# Patient Record
Sex: FEMALE | Race: ASIAN | NOT HISPANIC OR LATINO | Employment: UNEMPLOYED | URBAN - METROPOLITAN AREA
[De-identification: names, ages, dates, MRNs, and addresses within clinical notes are randomized per-mention and may not be internally consistent; named-entity substitution may affect disease eponyms.]

---

## 2017-01-06 ENCOUNTER — OFFICE VISIT (OUTPATIENT)
Dept: MEDICAL GROUP | Facility: MEDICAL CENTER | Age: 62
End: 2017-01-06
Payer: COMMERCIAL

## 2017-01-06 VITALS
HEIGHT: 59 IN | DIASTOLIC BLOOD PRESSURE: 74 MMHG | SYSTOLIC BLOOD PRESSURE: 122 MMHG | OXYGEN SATURATION: 96 % | TEMPERATURE: 98.6 F | HEART RATE: 74 BPM | RESPIRATION RATE: 16 BRPM | BODY MASS INDEX: 29.84 KG/M2 | WEIGHT: 148 LBS

## 2017-01-06 DIAGNOSIS — Z13.6 SCREENING FOR CARDIOVASCULAR CONDITION: ICD-10-CM

## 2017-01-06 DIAGNOSIS — I10 ESSENTIAL HYPERTENSION: ICD-10-CM

## 2017-01-06 DIAGNOSIS — H26.9 CATARACT OF BOTH EYES: ICD-10-CM

## 2017-01-06 DIAGNOSIS — M16.12 PRIMARY OSTEOARTHRITIS OF LEFT HIP: ICD-10-CM

## 2017-01-06 DIAGNOSIS — L30.9 ECZEMA, UNSPECIFIED TYPE: ICD-10-CM

## 2017-01-06 DIAGNOSIS — L30.1 DYSHIDROTIC ECZEMA: ICD-10-CM

## 2017-01-06 PROCEDURE — 99204 OFFICE O/P NEW MOD 45 MIN: CPT | Performed by: FAMILY MEDICINE

## 2017-01-06 RX ORDER — TRIAMCINOLONE ACETONIDE 1 MG/G
OINTMENT TOPICAL
Qty: 1 TUBE | Refills: 1 | Status: SHIPPED | OUTPATIENT
Start: 2017-01-06 | End: 2017-03-24

## 2017-01-06 RX ORDER — HYDROCODONE BITARTRATE AND ACETAMINOPHEN 5; 325 MG/1; MG/1
1 TABLET ORAL EVERY 8 HOURS PRN
Qty: 60 TAB | Refills: 0 | Status: SHIPPED | OUTPATIENT
Start: 2017-01-06 | End: 2018-01-23

## 2017-01-06 RX ORDER — LOSARTAN POTASSIUM 50 MG/1
50 TABLET ORAL
COMMUNITY
End: 2017-01-06

## 2017-01-06 RX ORDER — TRIAMCINOLONE ACETONIDE 1 MG/G
CREAM TOPICAL
Qty: 1 TUBE | Refills: 1 | Status: SHIPPED | OUTPATIENT
Start: 2017-01-06 | End: 2017-01-06 | Stop reason: CLARIF

## 2017-01-06 RX ORDER — LOSARTAN POTASSIUM 100 MG/1
100 TABLET ORAL DAILY
Qty: 90 TAB | Refills: 3 | Status: SHIPPED | OUTPATIENT
Start: 2017-01-06 | End: 2018-01-21 | Stop reason: SDUPTHER

## 2017-01-06 ASSESSMENT — PATIENT HEALTH QUESTIONNAIRE - PHQ9: CLINICAL INTERPRETATION OF PHQ2 SCORE: 0

## 2017-01-06 NOTE — MR AVS SNAPSHOT
"        Milana Hernandez   2017 8:00 AM   Office Visit   MRN: [id number]    Department:  [address] [place] [address]   Dept Phone:  [phone]    Description:  Female : 1955   Provider:  Abdirahman Lopez M.D.           Reason for Visit     Establish Care           Allergies as of 2017     No Known Allergies      You were diagnosed with     Essential hypertension   [4714782]       Screening for cardiovascular condition   [741473]       Primary osteoarthritis of left hip   [646607]       Cataract of both eyes   [740229]       Eczema, unspecified type   [2739217]       Dyshidrotic eczema   [824532]         Vital Signs     Blood Pressure Pulse Temperature Respirations Height Weight    122/74 mmHg 74 37 °C (98.6 °F) 16 1.499 m (4' 11.02\") 67.132 kg (148 lb)    Body Mass Index Oxygen Saturation Smoking Status             29.88 kg/m2 96% Never Smoker          Basic Information     Date Of Birth Sex Race Preferred Language       1955 Female White English       Problem List              ICD-10-CM Priority Class Noted - Resolved    Essential hypertension I10   2017 - Present    Primary osteoarthritis of left hip M16.12   2017 - Present    Cataract of both eyes H26.9   2017 - Present    Dyshidrotic eczema L30.1   2017 - Present      Health Maintenance     Patient has no pending health maintenance at this time      Current Immunizations     No immunizations on file.      Below and/or attached are the medications your provider expects you to take. Review all of your home medications and newly ordered medications with your provider and/or pharmacist. Follow medication instructions as directed by your provider and/or pharmacist. Please keep your medication list with you and share with your provider. Update the information when medications are discontinued, doses are changed, or new medications (including over-the-counter products) are added; and carry medication information at all times in the event " of emergency situations     Allergies:  No Known Allergies          Medications  Valid as of: January 06, 2017 -  9:08 AM    Generic Name Brand Name Tablet Size Instructions for use    Hydrocodone-Acetaminophen (Tab) NORCO 5-325 MG Take 1 Tab by mouth every 8 hours as needed (severe pain).        Losartan Potassium (Tab) COZAAR 100 MG Take 1 Tab by mouth every day.        Triamcinolone Acetonide (Ointment) KENALOG 0.1 % Apply thin layer to affected area twice a day for 14 days        .                 Medicines prescribed today were sent to:     [facility], [place] - [address] [place] [address]    Phone: [phone] Fax: [phone]    Open 24 Hours?: No      Medication refill instructions:       If your prescription bottle indicates you have medication refills left, it is not necessary to call your provider’s office. Please contact your pharmacy and they will refill your medication.    If your prescription bottle indicates you do not have any refills left, you may request refills at any time through one of the following ways: The online [email or web address] system (except Urgent Care), by calling your provider’s office, or by asking your pharmacy to contact your provider’s office with a refill request. Medication refills are processed only during regular business hours and may not be available until the next business day. Your provider may request additional information or to have a follow-up visit with you prior to refilling your medication.   *Please Note: Medication refills are assigned a new Rx number when refilled electronically. Your pharmacy may indicate that no refills were authorized even though a new prescription for the same medication is available at the pharmacy. Please request the medicine by name with the pharmacy before contacting your provider for a refill.        Your To Do List     Future Labs/Procedures Complete By Expires    COMP METABOLIC PANEL  As directed 1/6/2018     LIPID PROFILE  As directed 1/6/2018    URINALYSIS  As directed 1/6/2018      Referral     A referral request has been sent to our patient care coordination department. Please allow 3-5 business days for us to process this request and contact you either by phone or mail. If you do not hear from us by the 5th business day, please call us at [phone].           [employer] Access Code: [email or web address]  Expires: 2/2/2017  1:30 PM    [employer]  A secure, online tool to manage your health information     [employer]’s [employer]® is a secure, online tool that connects you to your personalized health information from the privacy of your home -- day or night - making it very easy for you to manage your healthcare. Once the activation process is completed, you can even access your medical information using the [employer] lupillo, which is available for free in the Apple Lupillo store or Google Play store.     [employer] provides the following levels of access (as shown below):   My Chart Features   [facility] Doctor [facility]  Specialists [facility]  Urgent  Care [facility]  Primary Care  Doctor   Email your healthcare team securely and privately 24/7 X X X    Manage appointments: schedule your next appointment; view details of past/upcoming appointments X      Request prescription refills. X      View recent personal medical records, including lab and immunizations X X X X   View health record, including health history, allergies, medications X X X X   Read reports about your outpatient visits, procedures, consult and ER notes X X X X   See your discharge summary, which is a recap of your hospital and/or ER visit that includes your diagnosis, lab results, and care plan. X X       How to register for [employer]:  1. Go to  [email or web address].  2. Click on the Sign Up Now box, which takes you to the New Member Sign Up page. You will need to provide the following information:  a. Enter your [employer] Access Code exactly as it appears  at the top of this page. (You will not need to use this code after you’ve completed the sign-up process. If you do not sign up before the expiration date, you must request a new code.)   b. Enter your date of birth.   c. Enter your home email address.   d. Click Submit, and follow the next screen’s instructions.  3. Create a [employer] ID. This will be your [employer] login ID and cannot be changed, so think of one that is secure and easy to remember.  4. Create a [employer] password. You can change your password at any time.  5. Enter your Password Reset Question and Answer. This can be used at a later time if you forget your password.   6. Enter your e-mail address. This allows you to receive e-mail notifications when new information is available in [employer].  7. Click Sign Up. You can now view your health information.    For assistance activating your [employer] account, call [phone]

## 2017-01-06 NOTE — PROGRESS NOTES
cc:  Multiple issues    Subjective:     Milana Hernandez is a 61 y.o. female presenting to establish care and to discuss the followin. Left hip pain:  Started about 2 years after walking a lot on a cruise.  Describes dull, achy pain that is intermittently sharp. Radiates to her left thigh and knee.  Has been progressively getting worse.  Associated with stiffness, now uses a cane to help with ambulation. Denies any redness, swelling, fevers, numbness/tingling, back pain. Used to do bartolo 1-2 x/week but now unable to exercise at all due to pain.  Has tried: massage tx, acupuncture, chiropractor, marijuana, hydrocodone 10, ibuprofen 800.  The meds and marijuana have helped but usually make her drowsy.  In November, she got x-rays, which she reports showed severe osteoarthritis in the left hip.  As she now has insurance, she would like treatment for the pain and is open to surgery.    2. HTN: has had hypertension for many years.  Is currently on losartan 50mg twice a day.  She was also on clonidine for a while but she stopped that on her own as she didn't think it was helping.  Does not check BPs at home.  Denies any chest pain, shortness of breath, leg swelling, jaw claudication, lightheadedness, dizziness.  Has not had any lab work done in several years.    3. Cataracts:  Was told that she has cataracts in both eyes several years ago.  Vision has been progressively getting worse, her night vision is problematic with lots of glare.  She already has an appt with the ophthalmologist, needs a referral.    4. Eczema:  Has had eczema of her hands for at least 4 years.  Started in the thumb area and has spread to her second digit bilaterally.  Is cracked, painful, itchy.  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  Has tried over the counter creams and betamethasone (unsure of dose) but not consistently.    Review of systems:  See above.  All other systems were reviewed and are  "negative.      Current outpatient prescriptions:   •  losartan (COZAAR) 100 MG Tab, Take 1 Tab by mouth every day., Disp: 90 Tab, Rfl: 3  •  hydrocodone-acetaminophen (NORCO) 5-325 MG Tab per tablet, Take 1 Tab by mouth every 8 hours as needed (severe pain)., Disp: 60 Tab, Rfl: 0  •  triamcinolone acetonide (KENALOG) 0.1 % Ointment, Apply thin layer to affected area twice a day for 14 days, Disp: 1 Tube, Rfl: 1    No Known Allergies    Past Medical History   Diagnosis Date   • Essential hypertension 1/6/2017     Past Surgical History   Procedure Laterality Date   • Tube & ectopic preg., removal       Family History   Problem Relation Age of Onset   • Diabetes Mother    • Arrythmia Father      afib     Social History     Social History   • Marital Status: N/A     Spouse Name: N/A   • Number of Children: N/A   • Years of Education: N/A     Occupational History   • Not on file.     Social History Main Topics   • Smoking status: Never Smoker    • Smokeless tobacco: Never Used   • Alcohol Use: 0.0 oz/week     0 Standard drinks or equivalent per week      Comment: rare   • Drug Use: No   • Sexual Activity: Not on file     Other Topics Concern   • Not on file     Social History Narrative       Objective:     Vitals: /74 mmHg  Pulse 74  Temp(Src) 37 °C (98.6 °F)  Resp 16  Ht 1.499 m (4' 11.02\")  Wt 67.132 kg (148 lb)  BMI 29.88 kg/m2  SpO2 96%  General: Alert, pleasant, NAD  HEENT: Normocephalic.  PERRL, EOMI, no icterus or pallor.  Conjunctivae and lids normal. External ears normal.  Neck supple.  No thyromegaly or masses palpated. No cervical or supraclavicular lymphadenopathy.  Heart: Regular rate and rhythm.  S1 and S2 normal.  Grade 2/6 systolic murmur best heard at RUSB  Respiratory: Normal respiratory effort.  Clear to auscultation bilaterally.    Abdomen: Non-distended, soft  Skin: Warm. Dry skin over thumbs and 2nd digits bilaterally with deep fissures.  Musculoskeletal: Gait is normal.  Moves all " extremities well.  Extremities: No leg edema.   Psych:  Affect/mood is normal, judgement is good, memory is intact, grooming is appropriate.    Assessment/Plan:     Milana was seen today for establish care.    Diagnoses and all orders for this visit:    Essential hypertension  Screening for cardiovascular condition  Is due for labs. Will change losartan to 100mg daily.  Discussed symptoms of low blood pressure to watch for when she starts the new prescription.  F/u in 3 months.  -     COMP METABOLIC PANEL; Future  -     LIPID PROFILE; Future  -     URINALYSIS; Future        -     losartan (COZAAR) 100 MG Tab; Take 1 Tab by mouth every day.    Primary osteoarthritis of left hip  Referral placed, advised pt to bring in report and film of x-rays done to appt.  Discussed pain management, will do tylenol 1000mg in the morning and norco 1-3x/day as needed.  She can continue to take ibuprofen 800mg daily.  Pt plans to take the norco only at night.  Discussed pros/cons of opioid use, she understands this is a temporary medication until surgery.  -     REFERRAL TO ORTHOPEDICS        -     hydrocodone-acetaminophen (NORCO) 5-325 MG Tab per tablet; Take 1 Tab by mouth every 8 hours as needed (severe pain).    Cataract of both eyes  -     REFERRAL TO OPHTHALMOLOGY    Eczema, unspecified type  Dyshidrotic eczema  Will try triamcinolone bid, wear gloves at night, try to cut down on washing hands or use hand cream immediately after.  -     triamcinolone acetonide (KENALOG) 0.1 % Ointment; Apply thin layer to affected area twice a day for 14 days         Return in about 3 months (around 4/6/2017) for Med check.

## 2017-02-06 ENCOUNTER — HOSPITAL ENCOUNTER (OUTPATIENT)
Dept: LAB | Facility: MEDICAL CENTER | Age: 62
End: 2017-02-06
Attending: FAMILY MEDICINE
Payer: COMMERCIAL

## 2017-02-06 DIAGNOSIS — Z13.6 SCREENING FOR CARDIOVASCULAR CONDITION: ICD-10-CM

## 2017-02-06 DIAGNOSIS — I10 ESSENTIAL HYPERTENSION: ICD-10-CM

## 2017-03-24 ENCOUNTER — OFFICE VISIT (OUTPATIENT)
Dept: MEDICAL GROUP | Facility: MEDICAL CENTER | Age: 62
End: 2017-03-24
Payer: COMMERCIAL

## 2017-03-24 VITALS
TEMPERATURE: 98.6 F | HEIGHT: 59 IN | RESPIRATION RATE: 14 BRPM | OXYGEN SATURATION: 95 % | BODY MASS INDEX: 29.43 KG/M2 | HEART RATE: 84 BPM | DIASTOLIC BLOOD PRESSURE: 90 MMHG | WEIGHT: 146 LBS | SYSTOLIC BLOOD PRESSURE: 138 MMHG

## 2017-03-24 DIAGNOSIS — Z12.31 ENCOUNTER FOR SCREENING MAMMOGRAM FOR MALIGNANT NEOPLASM OF BREAST: ICD-10-CM

## 2017-03-24 DIAGNOSIS — L30.1 DYSHIDROTIC ECZEMA: ICD-10-CM

## 2017-03-24 DIAGNOSIS — R32 URINARY INCONTINENCE, UNSPECIFIED TYPE: ICD-10-CM

## 2017-03-24 DIAGNOSIS — E78.2 MIXED HYPERLIPIDEMIA: ICD-10-CM

## 2017-03-24 DIAGNOSIS — Z12.11 SCREEN FOR COLON CANCER: ICD-10-CM

## 2017-03-24 DIAGNOSIS — Z12.4 SCREENING FOR CERVICAL CANCER: ICD-10-CM

## 2017-03-24 PROBLEM — M16.12 PRIMARY OSTEOARTHRITIS OF LEFT HIP: Status: RESOLVED | Noted: 2017-01-06 | Resolved: 2017-03-24

## 2017-03-24 PROCEDURE — 99214 OFFICE O/P EST MOD 30 MIN: CPT | Performed by: FAMILY MEDICINE

## 2017-03-24 RX ORDER — ASPIRIN 81 MG/1
81 TABLET, CHEWABLE ORAL DAILY
COMMUNITY
End: 2022-08-22 | Stop reason: SDUPTHER

## 2017-03-24 RX ORDER — MELOXICAM 15 MG/1
15 TABLET ORAL DAILY
COMMUNITY
End: 2017-07-03

## 2017-03-24 RX ORDER — ATORVASTATIN CALCIUM 20 MG/1
20 TABLET, FILM COATED ORAL
Qty: 90 TAB | Refills: 3 | Status: SHIPPED | OUTPATIENT
Start: 2017-03-24 | End: 2017-07-03 | Stop reason: SDUPTHER

## 2017-03-24 RX ORDER — CLOBETASOL PROPIONATE 0.5 MG/G
CREAM TOPICAL
Qty: 60 G | Refills: 2 | Status: SHIPPED | OUTPATIENT
Start: 2017-03-24 | End: 2018-06-01

## 2017-03-24 RX ORDER — ATORVASTATIN CALCIUM 20 MG/1
20 TABLET, FILM COATED ORAL
Qty: 90 TAB | Refills: 3 | Status: SHIPPED | OUTPATIENT
Start: 2017-03-24 | End: 2017-03-24 | Stop reason: SDUPTHER

## 2017-03-24 RX ORDER — CLOBETASOL PROPIONATE 0.5 MG/G
CREAM TOPICAL
Qty: 60 G | Refills: 2 | Status: SHIPPED | OUTPATIENT
Start: 2017-03-24 | End: 2017-03-24 | Stop reason: SDUPTHER

## 2017-03-24 NOTE — PATIENT INSTRUCTIONS
Cholesterol  Cholesterol is a white, waxy, fat-like substance needed by your body in small amounts. The liver makes all the cholesterol you need. Cholesterol is carried from the liver by the blood through the blood vessels. Deposits of cholesterol (plaque) may build up on blood vessel walls. These make the arteries narrower and stiffer. Cholesterol plaques increase the risk for heart attack and stroke.   You cannot feel your cholesterol level even if it is very high. The only way to know it is high is with a blood test. Once you know your cholesterol levels, you should keep a record of the test results. Work with your health care provider to keep your levels in the desired range.   WHAT DO THE RESULTS MEAN?  · Total cholesterol is a rough measure of all the cholesterol in your blood.    · LDL is the so-called bad cholesterol. This is the type that deposits cholesterol in the walls of the arteries. You want this level to be low.    · HDL is the good cholesterol because it cleans the arteries and carries the LDL away. You want this level to be high.  · Triglycerides are fat that the body can either burn for energy or store. High levels are closely linked to heart disease.    WHAT ARE THE DESIRED LEVELS OF CHOLESTEROL?  · Total cholesterol below 200.    · LDL below 100 for people at risk, below 70 for those at very high risk.    · HDL above 50 is good, above 60 is best.    · Triglycerides below 150.    HOW CAN I LOWER MY CHOLESTEROL?  · Diet. Follow your diet programs as directed by your health care provider.    ¨ Choose fish or white meat chicken and turkey, roasted or baked. Limit fatty cuts of red meat, fried foods, and processed meats, such as sausage and lunch meats.    ¨ Eat lots of fresh fruits and vegetables.  ¨ Choose whole grains, beans, pasta, potatoes, and cereals.    ¨ Use only small amounts of olive, corn, or canola oils.    ¨ Avoid butter, mayonnaise, shortening, or palm kernel oils.  ¨ Avoid foods with  "trans fats.    ¨ Drink skim or nonfat milk and eat low-fat or nonfat yogurt and cheeses. Avoid whole milk, cream, ice cream, egg yolks, and full-fat cheeses.    ¨ Healthy desserts include tarsha food cake, jazmyn snaps, animal crackers, hard candy, popsicles, and low-fat or nonfat frozen yogurt. Avoid pastries, cakes, pies, and cookies.    · Exercise. Follow your exercise programs as directed by your health care provider.    ¨ A regular program helps decrease LDL and raise HDL.    ¨ A regular program helps with weight control.    ¨ Do things that increase your activity level like gardening, walking, or taking the stairs. Ask your health care provider about how you can be more active in your daily life.    · Medicine. Take medicine only as directed by your health care provider.    ¨ Medicine may be prescribed by your health care provider to help lower cholesterol and decrease the risk for heart disease.    ¨ If you have several risk factors, you may need medicine even if your levels are normal.     This information is not intended to replace advice given to you by your health care provider. Make sure you discuss any questions you have with your health care provider.     Document Released: 09/12/2002 Document Revised: 01/08/2016 Document Reviewed: 10/01/2014  [employer] Interactive Patient Education ©2016 [employer]  DASH Eating Plan  DASH stands for \"Dietary Approaches to Stop Hypertension.\" The DASH eating plan is a healthy eating plan that has been shown to reduce high blood pressure (hypertension). Additional health benefits may include reducing the risk of type 2 diabetes mellitus, heart disease, and stroke. The DASH eating plan may also help with weight loss.  WHAT DO I NEED TO KNOW ABOUT THE DASH EATING PLAN?  For the DASH eating plan, you will follow these general guidelines:  · Choose foods with a percent daily value for sodium of less than 5% (as listed on the food label).  · Use salt-free seasonings or " "herbs instead of table salt or sea salt.  · Check with your health care provider or pharmacist before using salt substitutes.  · Eat lower-sodium products, often labeled as \"lower sodium\" or \"no salt added.\"  · Eat fresh foods.  · Eat more vegetables, fruits, and low-fat dairy products.  · Choose whole grains. Look for the word \"whole\" as the first word in the ingredient list.  · Choose fish and skinless chicken or turkey more often than red meat. Limit fish, poultry, and meat to 6 oz (170 g) each day.  · Limit sweets, desserts, sugars, and sugary drinks.  · Choose heart-healthy fats.  · Limit cheese to 1 oz (28 g) per day.  · Eat more home-cooked food and less restaurant, buffet, and fast food.  · Limit fried foods.  · Cook foods using methods other than frying.  · Limit canned vegetables. If you do use them, rinse them well to decrease the sodium.  · When eating at a restaurant, ask that your food be prepared with less salt, or no salt if possible.  WHAT FOODS CAN I EAT?  Seek help from a dietitian for individual calorie needs.  Grains  Whole grain or whole wheat bread. Brown rice. Whole grain or whole wheat pasta. Quinoa, bulgur, and whole grain cereals. Low-sodium cereals. Corn or whole wheat flour tortillas. Whole grain cornbread. Whole grain crackers. Low-sodium crackers.  Vegetables  Fresh or frozen vegetables (raw, steamed, roasted, or grilled). Low-sodium or reduced-sodium tomato and vegetable juices. Low-sodium or reduced-sodium tomato sauce and paste. Low-sodium or reduced-sodium canned vegetables.   Fruits  All fresh, canned (in natural juice), or frozen fruits.  Meat and Other Protein Products  Ground beef (85% or leaner), grass-fed beef, or beef trimmed of fat. Skinless chicken or turkey. Ground chicken or turkey. Pork trimmed of fat. All fish and seafood. Eggs. Dried beans, peas, or lentils. Unsalted nuts and seeds. Unsalted canned beans.  Dairy  Low-fat dairy products, such as skim or 1% milk, 2% or " reduced-fat cheeses, low-fat ricotta or cottage cheese, or plain low-fat yogurt. Low-sodium or reduced-sodium cheeses.  Fats and Oils  Tub margarines without trans fats. Light or reduced-fat mayonnaise and salad dressings (reduced sodium). Avocado. Safflower, olive, or canola oils. Natural peanut or almond butter.  Other  Unsalted popcorn and pretzels.  The items listed above may not be a complete list of recommended foods or beverages. Contact your dietitian for more options.  WHAT FOODS ARE NOT RECOMMENDED?  Grains  White bread. White pasta. White rice. Refined cornbread. Bagels and croissants. Crackers that contain trans fat.  Vegetables  Creamed or fried vegetables. Vegetables in a cheese sauce. Regular canned vegetables. Regular canned tomato sauce and paste. Regular tomato and vegetable juices.  Fruits  Dried fruits. Canned fruit in light or heavy syrup. Fruit juice.  Meat and Other Protein Products  Fatty cuts of meat. Ribs, chicken wings, ahn, sausage, bologna, salami, chitterlings, fatback, hot dogs, bratwurst, and packaged luncheon meats. Salted nuts and seeds. Canned beans with salt.  Dairy  Whole or 2% milk, cream, half-and-half, and cream cheese. Whole-fat or sweetened yogurt. Full-fat cheeses or blue cheese. Nondairy creamers and whipped toppings. Processed cheese, cheese spreads, or cheese curds.  Condiments  Onion and garlic salt, seasoned salt, table salt, and sea salt. Canned and packaged gravies. Worcestershire sauce. Tartar sauce. Barbecue sauce. Teriyaki sauce. Soy sauce, including reduced sodium. Steak sauce. Fish sauce. Oyster sauce. Cocktail sauce. Horseradish. Ketchup and mustard. Meat flavorings and tenderizers. Bouillon cubes. Hot sauce. Tabasco sauce. Marinades. Taco seasonings. Relishes.  Fats and Oils  Butter, stick margarine, lard, shortening, ghee, and ahn fat. Coconut, palm kernel, or palm oils. Regular salad dressings.  Other  Pickles and olives. Salted popcorn and  "pretzels.  The items listed above may not be a complete list of foods and beverages to avoid. Contact your dietitian for more information.  WHERE CAN I FIND MORE INFORMATION?  [employer] [place]: www.nhlbi.nih.gov/health/health-topics/topics/dash/     This information is not intended to replace advice given to you by your health care provider. Make sure you discuss any questions you have with your health care provider.     Document Released: 12/06/2012 Document Revised: 01/08/2016 Document Reviewed: 10/22/2014  [employer] Interactive Patient Education ©2016 [employer]  Food Choices to Lower Your Triglycerides  Triglycerides are a type of fat in your blood. High levels of triglycerides can increase the risk of heart disease and stroke. If your triglyceride levels are high, the foods you eat and your eating habits are very important. Choosing the right foods can help lower your triglycerides.   WHAT GENERAL GUIDELINES DO I NEED TO FOLLOW?  · Lose weight if you are overweight.    · Limit or avoid alcohol.    · Fill one half of your plate with vegetables and green salads.    · Limit fruit to two servings a day. Choose fruit instead of juice.    · Make one fourth of your plate whole grains. Look for the word \"whole\" as the first word in the ingredient list.  · Fill one fourth of your plate with lean protein foods.  · Enjoy fatty fish (such as salmon, mackerel, sardines, and tuna) three times a week.    · Choose healthy fats.    · Limit foods high in starch and sugar.  · Eat more home-cooked food and less restaurant, buffet, and fast food.  · Limit fried foods.  · Cook foods using methods other than frying.  · Limit saturated fats.  · Check ingredient lists to avoid foods with partially hydrogenated oils (trans fats) in them.  WHAT FOODS CAN I EAT?   Grains  Whole grains, such as whole wheat or whole grain breads, crackers, cereals, and pasta. Unsweetened oatmeal, bulgur, barley, quinoa, or brown " rice. Corn or whole wheat flour tortillas.   Vegetables  Fresh or frozen vegetables (raw, steamed, roasted, or grilled). Green salads.  Fruits  All fresh, canned (in natural juice), or frozen fruits.  Meat and Other Protein Products  Ground beef (85% or leaner), grass-fed beef, or beef trimmed of fat. Skinless chicken or turkey. Ground chicken or turkey. Pork trimmed of fat. All fish and seafood. Eggs. Dried beans, peas, or lentils. Unsalted nuts or seeds. Unsalted canned or dry beans.  Dairy  Low-fat dairy products, such as skim or 1% milk, 2% or reduced-fat cheeses, low-fat ricotta or cottage cheese, or plain low-fat yogurt.  Fats and Oils  Tub margarines without trans fats. Light or reduced-fat mayonnaise and salad dressings. Avocado. Safflower, olive, or canola oils. Natural peanut or almond butter.  The items listed above may not be a complete list of recommended foods or beverages. Contact your dietitian for more options.  WHAT FOODS ARE NOT RECOMMENDED?   Grains  White bread. White pasta. White rice. Cornbread. Bagels, pastries, and croissants. Crackers that contain trans fat.  Vegetables  White potatoes. Corn. Creamed or fried vegetables. Vegetables in a cheese sauce.  Fruits  Dried fruits. Canned fruit in light or heavy syrup. Fruit juice.  Meat and Other Protein Products  Fatty cuts of meat. Ribs, chicken wings, ahn, sausage, bologna, salami, chitterlings, fatback, hot dogs, bratwurst, and packaged luncheon meats.  Dairy  Whole or 2% milk, cream, half-and-half, and cream cheese. Whole-fat or sweetened yogurt. Full-fat cheeses. Nondairy creamers and whipped toppings. Processed cheese, cheese spreads, or cheese curds.  Sweets and Desserts  Corn syrup, sugars, honey, and molasses. Candy. Jam and jelly. Syrup. Sweetened cereals. Cookies, pies, cakes, donuts, muffins, and ice cream.  Fats and Oils  Butter, stick margarine, lard, shortening, ghee, or ahn fat. Coconut, palm kernel, or palm  "oils.  Beverages  Alcohol. Sweetened drinks (such as sodas, lemonade, and fruit drinks or punches).  The items listed above may not be a complete list of foods and beverages to avoid. Contact your dietitian for more information.     This information is not intended to replace advice given to you by your health care provider. Make sure you discuss any questions you have with your health care provider.     Document Released: 10/05/2005 Document Revised: 01/08/2016 Document Reviewed: 10/22/2014  [employer] Interactive Patient Education ©2016 [employer]  Fat and Cholesterol Restricted Diet  Getting too much fat and cholesterol in your diet may cause health problems. Following this diet helps keep your fat and cholesterol at normal levels. This can keep you from getting sick.  WHAT TYPES OF FAT SHOULD I CHOOSE?  · Choose monosaturated and polyunsaturated fats. These are found in foods such as olive oil, canola oil, flaxseeds, walnuts, almonds, and seeds.  · Eat more omega-3 fats. Good choices include salmon, mackerel, sardines, tuna, flaxseed oil, and ground flaxseeds.  · Limit saturated fats. These are in animal products such as meats, butter, and cream. They can also be in plant products such as palm oil, palm kernel oil, and coconut oil.  ·  Avoid foods with partially hydrogenated oils in them. These contain trans fats. Examples of foods that have trans fats are stick margarine, some tub margarines, cookies, crackers, and other baked goods.  WHAT GENERAL GUIDELINES DO I NEED TO FOLLOW?   · Check food labels. Look for the words \"trans fat\" and \"saturated fat.\"  · When preparing a meal:  ¨ Fill half of your plate with vegetables and green salads.  ¨ Fill one fourth of your plate with whole grains. Look for the word \"whole\" as the first word in the ingredient list.  ¨ Fill one fourth of your plate with lean protein foods.  · Limit fruit to two servings a day. Choose fruit instead of juice.  · Eat more foods with soluble " fiber. Examples of foods with this type of fiber are apples, broccoli, carrots, beans, peas, and barley. Try to get 20-30 g (grams) of fiber per day.  · Eat more home-cooked foods. Eat less at restaurants and buffets.  · Limit or avoid alcohol.  · Limit foods high in starch and sugar.  · Limit fried foods.  · Cook foods without frying them. Baking, boiling, grilling, and broiling are all great options.  · Lose weight if you are overweight. Losing even a small amount of weight can help your overall health. It can also help prevent diseases such as diabetes and heart disease.  WHAT FOODS CAN I EAT?  Grains  Whole grains, such as whole wheat or whole grain breads, crackers, cereals, and pasta. Unsweetened oatmeal, bulgur, barley, quinoa, or brown rice. Corn or whole wheat flour tortillas.  Vegetables  Fresh or frozen vegetables (raw, steamed, roasted, or grilled). Green salads.  Fruits  All fresh, canned (in natural juice), or frozen fruits.  Meat and Other Protein Products  Ground beef (85% or leaner), grass-fed beef, or beef trimmed of fat. Skinless chicken or turkey. Ground chicken or turkey. Pork trimmed of fat. All fish and seafood. Eggs. Dried beans, peas, or lentils. Unsalted nuts or seeds. Unsalted canned or dry beans.  Dairy  Low-fat dairy products, such as skim or 1% milk, 2% or reduced-fat cheeses, low-fat ricotta or cottage cheese, or plain low-fat yogurt.  Fats and Oils  Tub margarines without trans fats. Light or reduced-fat mayonnaise and salad dressings. Avocado. Olive, canola, sesame, or safflower oils. Natural peanut or almond butter (choose ones without added sugar and oil).  The items listed above may not be a complete list of recommended foods or beverages. Contact your dietitian for more options.  WHAT FOODS ARE NOT RECOMMENDED?  Grains  White bread. White pasta. White rice. Cornbread. Bagels, pastries, and croissants. Crackers that contain trans fat.  Vegetables  White potatoes. Corn. Creamed or  fried vegetables. Vegetables in a cheese sauce.  Fruits  Dried fruits. Canned fruit in light or heavy syrup. Fruit juice.  Meat and Other Protein Products  Fatty cuts of meat. Ribs, chicken wings, ahn, sausage, bologna, salami, chitterlings, fatback, hot dogs, bratwurst, and packaged luncheon meats. Liver and organ meats.  Dairy  Whole or 2% milk, cream, half-and-half, and cream cheese. Whole milk cheeses. Whole-fat or sweetened yogurt. Full-fat cheeses. Nondairy creamers and whipped toppings. Processed cheese, cheese spreads, or cheese curds.  Sweets and Desserts  Corn syrup, sugars, honey, and molasses. Candy. Jam and jelly. Syrup. Sweetened cereals. Cookies, pies, cakes, donuts, muffins, and ice cream.  Fats and Oils  Butter, stick margarine, lard, shortening, ghee, or ahn fat. Coconut, palm kernel, or palm oils.  Beverages  Alcohol. Sweetened drinks (such as sodas, lemonade, and fruit drinks or punches).  The items listed above may not be a complete list of foods and beverages to avoid. Contact your dietitian for more information.     This information is not intended to replace advice given to you by your health care provider. Make sure you discuss any questions you have with your health care provider.     Document Released: 06/18/2013 Document Revised: 01/08/2016 Document Reviewed: 03/19/2015  [employer] Interactive Patient Education ©2016 [employer]

## 2017-03-24 NOTE — MR AVS SNAPSHOT
"        Milana Hernandez   3/24/2017 2:20 PM   Office Visit   MRN: [id number]    Department:  [address] [place] [address]   Dept Phone:  [phone]    Description:  Female : 1955   Provider:  Abdirahman Lopez M.D.           Reason for Visit     Hypertension           Allergies as of 3/24/2017     No Known Allergies      You were diagnosed with     Mixed hyperlipidemia   [272.2.ICD-9-CM]       Encounter for screening mammogram for malignant neoplasm of breast   [324258]       Screening for cervical cancer   [894327]       Urinary incontinence, unspecified type   [2157725]       Screen for colon cancer   [485232]         Vital Signs     Blood Pressure Pulse Temperature Respirations Height Weight    138/90 mmHg 84 37 °C (98.6 °F) 14 1.499 m (4' 11.02\") 66.225 kg (146 lb)    Body Mass Index Oxygen Saturation Smoking Status             29.47 kg/m2 95% Never Smoker          Basic Information     Date Of Birth Sex Race Ethnicity Preferred Language    1955 Female White Non- English      Your appointments     2017  9:00 AM   Established Patient with Abdirahman Lopez M.D.   [facility] [address]    [address]  [facility] [address]  [facility] [address] [phone]           You will be receiving a confirmation call a few days before your appointment from our automated call confirmation system.              Problem List              ICD-10-CM Priority Class Noted - Resolved    Essential hypertension I10   2017 - Present    Primary osteoarthritis of left hip M16.12   2017 - Present    Cataract of both eyes H26.9   2017 - Present    Dyshidrotic eczema L30.1   2017 - Present    Mixed hyperlipidemia E78.2   3/24/2017 - Present      Health Maintenance        Date Due Completion Dates    IMM DTaP/Tdap/Td Vaccine (1 - Tdap) 1972 ---    PAP SMEAR 1974 ---    MAMMOGRAM 1993 ---    COLONOSCOPY 2003 ---    IMM ZOSTER VACCINE 2013 ---    IMM INFLUENZA (1) " 9/1/2016 ---            Current Immunizations     No immunizations on file.      Below and/or attached are the medications your provider expects you to take. Review all of your home medications and newly ordered medications with your provider and/or pharmacist. Follow medication instructions as directed by your provider and/or pharmacist. Please keep your medication list with you and share with your provider. Update the information when medications are discontinued, doses are changed, or new medications (including over-the-counter products) are added; and carry medication information at all times in the event of emergency situations     Allergies:  No Known Allergies          Medications  Valid as of: March 24, 2017 -  2:38 PM    Generic Name Brand Name Tablet Size Instructions for use    Aspirin (Chew Tab) ASA 81 MG Take 81 mg by mouth every day.        Atorvastatin Calcium (Tab) LIPITOR 20 MG Take 1 Tab by mouth every bedtime. Indications: high cholesterol        Clobetasol Propionate (Cream) TEMOVATE 0.05 % Apply thin layer twice a day to affected area.  Do not use more than 14 days at a time        Hydrocodone-Acetaminophen (Tab) NORCO 5-325 MG Take 1 Tab by mouth every 8 hours as needed (severe pain).        Losartan Potassium (Tab) COZAAR 100 MG Take 1 Tab by mouth every day.        Meloxicam (Tab) MOBIC 15 MG Take 15 mg by mouth every day.        .                 Medicines prescribed today were sent to:     [facility] [address] [place] [address]    Phone: [phone] Fax: [phone]    Open 24 Hours?: No      Medication refill instructions:       If your prescription bottle indicates you have medication refills left, it is not necessary to call your provider’s office. Please contact your pharmacy and they will refill your medication.    If your prescription bottle indicates you do not have any refills left, you may request refills at any time  through one of the following ways: The online [employer] system (except Urgent Care), by calling your provider’s office, or by asking your pharmacy to contact your provider’s office with a refill request. Medication refills are processed only during regular business hours and may not be available until the next business day. Your provider may request additional information or to have a follow-up visit with you prior to refilling your medication.   *Please Note: Medication refills are assigned a new Rx number when refilled electronically. Your pharmacy may indicate that no refills were authorized even though a new prescription for the same medication is available at the pharmacy. Please request the medicine by name with the pharmacy before contacting your provider for a refill.        Your To Do List     Future Labs/Procedures Complete By Expires    LIPID PROFILE  6/24/2017 (Approximate) 3/24/2018    MA-SCREEN MAMMO W/CAD-BILAT  As directed 3/24/2018    OCCULT BLOOD FECES IMMUNOASSAY  As directed 3/24/2018      Referral     A referral request has been sent to our patient care coordination department. Please allow 3-5 business days for us to process this request and contact you either by phone or mail. If you do not hear from us by the 5th business day, please call us at [phone].        Instructions    Cholesterol  Cholesterol is a white, waxy, fat-like substance needed by your body in small amounts. The liver makes all the cholesterol you need. Cholesterol is carried from the liver by the blood through the blood vessels. Deposits of cholesterol (plaque) may build up on blood vessel walls. These make the arteries narrower and stiffer. Cholesterol plaques increase the risk for heart attack and stroke.   You cannot feel your cholesterol level even if it is very high. The only way to know it is high is with a blood test. Once you know your cholesterol levels, you should keep a record of the test results. Work with  your health care provider to keep your levels in the desired range.   WHAT DO THE RESULTS MEAN?  · Total cholesterol is a rough measure of all the cholesterol in your blood.    · LDL is the so-called bad cholesterol. This is the type that deposits cholesterol in the walls of the arteries. You want this level to be low.    · HDL is the good cholesterol because it cleans the arteries and carries the LDL away. You want this level to be high.  · Triglycerides are fat that the body can either burn for energy or store. High levels are closely linked to heart disease.    WHAT ARE THE DESIRED LEVELS OF CHOLESTEROL?  · Total cholesterol below 200.    · LDL below 100 for people at risk, below 70 for those at very high risk.    · HDL above 50 is good, above 60 is best.    · Triglycerides below 150.    HOW CAN I LOWER MY CHOLESTEROL?  · Diet. Follow your diet programs as directed by your health care provider.    ¨ Choose fish or white meat chicken and turkey, roasted or baked. Limit fatty cuts of red meat, fried foods, and processed meats, such as sausage and lunch meats.    ¨ Eat lots of fresh fruits and vegetables.  ¨ Choose whole grains, beans, pasta, potatoes, and cereals.    ¨ Use only small amounts of olive, corn, or canola oils.    ¨ Avoid butter, mayonnaise, shortening, or palm kernel oils.  ¨ Avoid foods with trans fats.    ¨ Drink skim or nonfat milk and eat low-fat or nonfat yogurt and cheeses. Avoid whole milk, cream, ice cream, egg yolks, and full-fat cheeses.    ¨ Healthy desserts include tarsha food cake, jazmyn snaps, animal crackers, hard candy, popsicles, and low-fat or nonfat frozen yogurt. Avoid pastries, cakes, pies, and cookies.    · Exercise. Follow your exercise programs as directed by your health care provider.    ¨ A regular program helps decrease LDL and raise HDL.    ¨ A regular program helps with weight control.    ¨ Do things that increase your activity level like gardening, walking, or taking the  "stairs. Ask your health care provider about how you can be more active in your daily life.    · Medicine. Take medicine only as directed by your health care provider.    ¨ Medicine may be prescribed by your health care provider to help lower cholesterol and decrease the risk for heart disease.    ¨ If you have several risk factors, you may need medicine even if your levels are normal.     This information is not intended to replace advice given to you by your health care provider. Make sure you discuss any questions you have with your health care provider.     Document Released: 09/12/2002 Document Revised: 01/08/2016 Document Reviewed: 10/01/2014  [employer] Interactive Patient Education ©2016 [employer]vier Inc.  DASH Eating Plan  DASH stands for \"Dietary Approaches to Stop Hypertension.\" The DASH eating plan is a healthy eating plan that has been shown to reduce high blood pressure (hypertension). Additional health benefits may include reducing the risk of type 2 diabetes mellitus, heart disease, and stroke. The DASH eating plan may also help with weight loss.  WHAT DO I NEED TO KNOW ABOUT THE DASH EATING PLAN?  For the DASH eating plan, you will follow these general guidelines:  · Choose foods with a percent daily value for sodium of less than 5% (as listed on the food label).  · Use salt-free seasonings or herbs instead of table salt or sea salt.  · Check with your health care provider or pharmacist before using salt substitutes.  · Eat lower-sodium products, often labeled as \"lower sodium\" or \"no salt added.\"  · Eat fresh foods.  · Eat more vegetables, fruits, and low-fat dairy products.  · Choose whole grains. Look for the word \"whole\" as the first word in the ingredient list.  · Choose fish and skinless chicken or turkey more often than red meat. Limit fish, poultry, and meat to 6 oz (170 g) each day.  · Limit sweets, desserts, sugars, and sugary drinks.  · Choose heart-healthy fats.  · Limit cheese to 1 oz (28 g) " per day.  · Eat more home-cooked food and less restaurant, buffet, and fast food.  · Limit fried foods.  · Cook foods using methods other than frying.  · Limit canned vegetables. If you do use them, rinse them well to decrease the sodium.  · When eating at a restaurant, ask that your food be prepared with less salt, or no salt if possible.  WHAT FOODS CAN I EAT?  Seek help from a dietitian for individual calorie needs.  Grains  Whole grain or whole wheat bread. Brown rice. Whole grain or whole wheat pasta. Quinoa, bulgur, and whole grain cereals. Low-sodium cereals. Corn or whole wheat flour tortillas. Whole grain cornbread. Whole grain crackers. Low-sodium crackers.  Vegetables  Fresh or frozen vegetables (raw, steamed, roasted, or grilled). Low-sodium or reduced-sodium tomato and vegetable juices. Low-sodium or reduced-sodium tomato sauce and paste. Low-sodium or reduced-sodium canned vegetables.   Fruits  All fresh, canned (in natural juice), or frozen fruits.  Meat and Other Protein Products  Ground beef (85% or leaner), grass-fed beef, or beef trimmed of fat. Skinless chicken or turkey. Ground chicken or turkey. Pork trimmed of fat. All fish and seafood. Eggs. Dried beans, peas, or lentils. Unsalted nuts and seeds. Unsalted canned beans.  Dairy  Low-fat dairy products, such as skim or 1% milk, 2% or reduced-fat cheeses, low-fat ricotta or cottage cheese, or plain low-fat yogurt. Low-sodium or reduced-sodium cheeses.  Fats and Oils  Tub margarines without trans fats. Light or reduced-fat mayonnaise and salad dressings (reduced sodium). Avocado. Safflower, olive, or canola oils. Natural peanut or almond butter.  Other  Unsalted popcorn and pretzels.  The items listed above may not be a complete list of recommended foods or beverages. Contact your dietitian for more options.  WHAT FOODS ARE NOT RECOMMENDED?  Grains  White bread. White pasta. White rice. Refined cornbread. Bagels and croissants. Crackers that  contain trans fat.  Vegetables  Creamed or fried vegetables. Vegetables in a cheese sauce. Regular canned vegetables. Regular canned tomato sauce and paste. Regular tomato and vegetable juices.  Fruits  Dried fruits. Canned fruit in light or heavy syrup. Fruit juice.  Meat and Other Protein Products  Fatty cuts of meat. Ribs, chicken wings, ahn, sausage, bologna, salami, chitterlings, fatback, hot dogs, bratwurst, and packaged luncheon meats. Salted nuts and seeds. Canned beans with salt.  Dairy  Whole or 2% milk, cream, half-and-half, and cream cheese. Whole-fat or sweetened yogurt. Full-fat cheeses or blue cheese. Nondairy creamers and whipped toppings. Processed cheese, cheese spreads, or cheese curds.  Condiments  Onion and garlic salt, seasoned salt, table salt, and sea salt. Canned and packaged gravies. Worcestershire sauce. Tartar sauce. Barbecue sauce. Teriyaki sauce. Soy sauce, including reduced sodium. Steak sauce. Fish sauce. Oyster sauce. Cocktail sauce. Horseradish. Ketchup and mustard. Meat flavorings and tenderizers. Bouillon cubes. Hot sauce. Tabasco sauce. Marinades. Taco seasonings. Relishes.  Fats and Oils  Butter, stick margarine, lard, shortening, ghee, and ahn fat. Coconut, palm kernel, or palm oils. Regular salad dressings.  Other  Pickles and olives. Salted popcorn and pretzels.  The items listed above may not be a complete list of foods and beverages to avoid. Contact your dietitian for more information.  WHERE CAN I FIND MORE INFORMATION?  National [employer], [employer], and [employer] [place]: www.nhlbi.nih.gov/health/health-topics/topics/dash/     This information is not intended to replace advice given to you by your health care provider. Make sure you discuss any questions you have with your health care provider.     Document Released: 12/06/2012 Document Revised: 01/08/2016 Document Reviewed: 10/22/2014  [employer] Interactive Patient Education ©2016 [employer]  Food Choices to Lower Your  "Triglycerides  Triglycerides are a type of fat in your blood. High levels of triglycerides can increase the risk of heart disease and stroke. If your triglyceride levels are high, the foods you eat and your eating habits are very important. Choosing the right foods can help lower your triglycerides.   WHAT GENERAL GUIDELINES DO I NEED TO FOLLOW?  · Lose weight if you are overweight.    · Limit or avoid alcohol.    · Fill one half of your plate with vegetables and green salads.    · Limit fruit to two servings a day. Choose fruit instead of juice.    · Make one fourth of your plate whole grains. Look for the word \"whole\" as the first word in the ingredient list.  · Fill one fourth of your plate with lean protein foods.  · Enjoy fatty fish (such as salmon, mackerel, sardines, and tuna) three times a week.    · Choose healthy fats.    · Limit foods high in starch and sugar.  · Eat more home-cooked food and less restaurant, buffet, and fast food.  · Limit fried foods.  · Cook foods using methods other than frying.  · Limit saturated fats.  · Check ingredient lists to avoid foods with partially hydrogenated oils (trans fats) in them.  WHAT FOODS CAN I EAT?   Grains  Whole grains, such as whole wheat or whole grain breads, crackers, cereals, and pasta. Unsweetened oatmeal, bulgur, barley, quinoa, or brown rice. Corn or whole wheat flour tortillas.   Vegetables  Fresh or frozen vegetables (raw, steamed, roasted, or grilled). Green salads.  Fruits  All fresh, canned (in natural juice), or frozen fruits.  Meat and Other Protein Products  Ground beef (85% or leaner), grass-fed beef, or beef trimmed of fat. Skinless chicken or turkey. Ground chicken or turkey. Pork trimmed of fat. All fish and seafood. Eggs. Dried beans, peas, or lentils. Unsalted nuts or seeds. Unsalted canned or dry beans.  Dairy  Low-fat dairy products, such as skim or 1% milk, 2% or reduced-fat cheeses, low-fat ricotta or cottage cheese, or plain low-fat " yogurt.  Fats and Oils  Tub margarines without trans fats. Light or reduced-fat mayonnaise and salad dressings. Avocado. Safflower, olive, or canola oils. Natural peanut or almond butter.  The items listed above may not be a complete list of recommended foods or beverages. Contact your dietitian for more options.  WHAT FOODS ARE NOT RECOMMENDED?   Grains  White bread. White pasta. White rice. Cornbread. Bagels, pastries, and croissants. Crackers that contain trans fat.  Vegetables  White potatoes. Corn. Creamed or fried vegetables. Vegetables in a cheese sauce.  Fruits  Dried fruits. Canned fruit in light or heavy syrup. Fruit juice.  Meat and Other Protein Products  Fatty cuts of meat. Ribs, chicken wings, ahn, sausage, bologna, salami, chitterlings, fatback, hot dogs, bratwurst, and packaged luncheon meats.  Dairy  Whole or 2% milk, cream, half-and-half, and cream cheese. Whole-fat or sweetened yogurt. Full-fat cheeses. Nondairy creamers and whipped toppings. Processed cheese, cheese spreads, or cheese curds.  Sweets and Desserts  Corn syrup, sugars, honey, and molasses. Candy. Jam and jelly. Syrup. Sweetened cereals. Cookies, pies, cakes, donuts, muffins, and ice cream.  Fats and Oils  Butter, stick margarine, lard, shortening, ghee, or ahn fat. Coconut, palm kernel, or palm oils.  Beverages  Alcohol. Sweetened drinks (such as sodas, lemonade, and fruit drinks or punches).  The items listed above may not be a complete list of foods and beverages to avoid. Contact your dietitian for more information.     This information is not intended to replace advice given to you by your health care provider. Make sure you discuss any questions you have with your health care provider.     Document Released: 10/05/2005 Document Revised: 01/08/2016 Document Reviewed: 10/22/2014  [employer] Interactive Patient Education ©2016 [employer]  Fat and Cholesterol Restricted Diet  Getting too much fat and cholesterol in your diet  "may cause health problems. Following this diet helps keep your fat and cholesterol at normal levels. This can keep you from getting sick.  WHAT TYPES OF FAT SHOULD I CHOOSE?  · Choose monosaturated and polyunsaturated fats. These are found in foods such as olive oil, canola oil, flaxseeds, walnuts, almonds, and seeds.  · Eat more omega-3 fats. Good choices include salmon, mackerel, sardines, tuna, flaxseed oil, and ground flaxseeds.  · Limit saturated fats. These are in animal products such as meats, butter, and cream. They can also be in plant products such as palm oil, palm kernel oil, and coconut oil.  ·  Avoid foods with partially hydrogenated oils in them. These contain trans fats. Examples of foods that have trans fats are stick margarine, some tub margarines, cookies, crackers, and other baked goods.  WHAT GENERAL GUIDELINES DO I NEED TO FOLLOW?   · Check food labels. Look for the words \"trans fat\" and \"saturated fat.\"  · When preparing a meal:  ¨ Fill half of your plate with vegetables and green salads.  ¨ Fill one fourth of your plate with whole grains. Look for the word \"whole\" as the first word in the ingredient list.  ¨ Fill one fourth of your plate with lean protein foods.  · Limit fruit to two servings a day. Choose fruit instead of juice.  · Eat more foods with soluble fiber. Examples of foods with this type of fiber are apples, broccoli, carrots, beans, peas, and barley. Try to get 20-30 g (grams) of fiber per day.  · Eat more home-cooked foods. Eat less at restaurants and buffets.  · Limit or avoid alcohol.  · Limit foods high in starch and sugar.  · Limit fried foods.  · Cook foods without frying them. Baking, boiling, grilling, and broiling are all great options.  · Lose weight if you are overweight. Losing even a small amount of weight can help your overall health. It can also help prevent diseases such as diabetes and heart disease.  WHAT FOODS CAN I EAT?  Grains  Whole grains, such as whole " wheat or whole grain breads, crackers, cereals, and pasta. Unsweetened oatmeal, bulgur, barley, quinoa, or brown rice. Corn or whole wheat flour tortillas.  Vegetables  Fresh or frozen vegetables (raw, steamed, roasted, or grilled). Green salads.  Fruits  All fresh, canned (in natural juice), or frozen fruits.  Meat and Other Protein Products  Ground beef (85% or leaner), grass-fed beef, or beef trimmed of fat. Skinless chicken or turkey. Ground chicken or turkey. Pork trimmed of fat. All fish and seafood. Eggs. Dried beans, peas, or lentils. Unsalted nuts or seeds. Unsalted canned or dry beans.  Dairy  Low-fat dairy products, such as skim or 1% milk, 2% or reduced-fat cheeses, low-fat ricotta or cottage cheese, or plain low-fat yogurt.  Fats and Oils  Tub margarines without trans fats. Light or reduced-fat mayonnaise and salad dressings. Avocado. Olive, canola, sesame, or safflower oils. Natural peanut or almond butter (choose ones without added sugar and oil).  The items listed above may not be a complete list of recommended foods or beverages. Contact your dietitian for more options.  WHAT FOODS ARE NOT RECOMMENDED?  Grains  White bread. White pasta. White rice. Cornbread. Bagels, pastries, and croissants. Crackers that contain trans fat.  Vegetables  White potatoes. Corn. Creamed or fried vegetables. Vegetables in a cheese sauce.  Fruits  Dried fruits. Canned fruit in light or heavy syrup. Fruit juice.  Meat and Other Protein Products  Fatty cuts of meat. Ribs, chicken wings, ahn, sausage, bologna, salami, chitterlings, fatback, hot dogs, bratwurst, and packaged luncheon meats. Liver and organ meats.  Dairy  Whole or 2% milk, cream, half-and-half, and cream cheese. Whole milk cheeses. Whole-fat or sweetened yogurt. Full-fat cheeses. Nondairy creamers and whipped toppings. Processed cheese, cheese spreads, or cheese curds.  Sweets and Desserts  Corn syrup, sugars, honey, and molasses. Candy. Jam and jelly.  Syrup. Sweetened cereals. Cookies, pies, cakes, donuts, muffins, and ice cream.  Fats and Oils  Butter, stick margarine, lard, shortening, ghee, or ahn fat. Coconut, palm kernel, or palm oils.  Beverages  Alcohol. Sweetened drinks (such as sodas, lemonade, and fruit drinks or punches).  The items listed above may not be a complete list of foods and beverages to avoid. Contact your dietitian for more information.     This information is not intended to replace advice given to you by your health care provider. Make sure you discuss any questions you have with your health care provider.     Document Released: 06/18/2013 Document Revised: 01/08/2016 Document Reviewed: 03/19/2015  [employer] Interactive Patient Education ©2016 [employer]vier Inc.            [email or web address] Access Code: Activation code not generated  Current [employer] Status: Active

## 2017-03-24 NOTE — PROGRESS NOTES
cc:  cholesterol    Subjective:     Milana Hernandez is a 61 y.o. female presenting for a follow up of her cholesterol.  Trigs were elevated, LDL was unable to be calculated, HDL low, total cholesterol high.  She reports having a poor diet, high in fried foods and carbs, low on fruits and vegetables. Is motivated to change diet.  Exercise has been limited as she recently had her left hip replaced as she had arthritis.  However, she plans to resume her regular walks in the near future.  Otherwise feels well, no chest pain, shortness of breath, leg swelling, cough, abdominal pain.  Does have some urine leakage intermittently, would like to see a gynecologist for that and is due for a pap smear.    Review of systems:  See above.          Current outpatient prescriptions:   •  meloxicam (MOBIC) 15 MG tablet, Take 15 mg by mouth every day., Disp: , Rfl:   •  aspirin (ASA) 81 MG Chew Tab chewable tablet, Take 81 mg by mouth every day., Disp: , Rfl:   •  clobetasol (TEMOVATE) 0.05 % Cream, Apply thin layer twice a day to affected area.  Do not use more than 14 days at a time, Disp: 60 g, Rfl: 2  •  atorvastatin (LIPITOR) 20 MG Tab, Take 1 Tab by mouth every bedtime. Indications: high cholesterol, Disp: 90 Tab, Rfl: 3  •  losartan (COZAAR) 100 MG Tab, Take 1 Tab by mouth every day., Disp: 90 Tab, Rfl: 3  •  hydrocodone-acetaminophen (NORCO) 5-325 MG Tab per tablet, Take 1 Tab by mouth every 8 hours as needed (severe pain)., Disp: 60 Tab, Rfl: 0    No Known Allergies    Past Medical History   Diagnosis Date   • Essential hypertension 1/6/2017     Past Surgical History   Procedure Laterality Date   • Tube & ectopic preg., removal     • Hip arthroplasty total Left 2/22/2017     Family History   Problem Relation Age of Onset   • Diabetes Mother    • Arrythmia Father      afib     Social History     Social History   • Marital Status:      Spouse Name: N/A   • Number of Children: N/A   • Years of Education: N/A  "    Occupational History   • Not on file.     Social History Main Topics   • Smoking status: Never Smoker    • Smokeless tobacco: Never Used   • Alcohol Use: 0.0 oz/week     0 Standard drinks or equivalent per week      Comment: rare   • Drug Use: No   • Sexual Activity: Not on file     Other Topics Concern   • Not on file     Social History Narrative       Objective:     Vitals: /90 mmHg  Pulse 84  Temp(Src) 37 °C (98.6 °F)  Resp 14  Ht 1.499 m (4' 11.02\")  Wt 66.225 kg (146 lb)  BMI 29.47 kg/m2  SpO2 95%  General: Alert, pleasant, NAD  HEENT: Normocephalic.    Heart: Regular rate and rhythm.  S1 and S2 normal.  No murmurs appreciated.  Respiratory: Normal respiratory effort.  Clear to auscultation bilaterally.    Abdomen: Non-distended, soft  Skin: Warm, dry, no rashes.  Psych:  Affect/mood is normal, judgement is good, memory is intact, grooming is appropriate.    Assessment/Plan:     Milana was seen today for hypertension.    Diagnoses and all orders for this visit:    Mixed hyperlipidemia  New diagnosis.  ascvd risk is 8.5%.  Discussed pros/cons to statins, will start lipitor 20mg daily.  F/u in 3 months with repeat lipid profile.  Discussed healthy diet and exercise.  -     LIPID PROFILE; Future        -     atorvastatin (LIPITOR) 20 MG Tab; Take 1 Tab by mouth every bedtime. Indications: high cholesterol    Encounter for screening mammogram for malignant neoplasm of breast  -     MA-SCREEN MAMMO W/CAD-BILAT; Future    Screening for cervical cancer  Urinary incontinence, unspecified type  -     REFERRAL TO GYNECOLOGY    Screen for colon cancer  -     OCCULT BLOOD FECES IMMUNOASSAY; Future    Dyshidrotic eczema  -     clobetasol (TEMOVATE) 0.05 % Cream; Apply thin layer twice a day to affected area.  Do not use more than 14 days at a time          Return in about 3 months (around 6/24/2017) for Med check.  "

## 2017-06-26 ENCOUNTER — TELEPHONE (OUTPATIENT)
Dept: MEDICAL GROUP | Facility: MEDICAL CENTER | Age: 62
End: 2017-06-26

## 2017-06-26 DIAGNOSIS — R63.5 WEIGHT GAIN: ICD-10-CM

## 2017-06-26 NOTE — TELEPHONE ENCOUNTER
1. Caller Name: Milana                                          Call Back Number: [phone] (home)         Patient approves a detailed voicemail message: yes    Patient wants to see if you can order TSH for her weight problem

## 2017-07-03 ENCOUNTER — OFFICE VISIT (OUTPATIENT)
Dept: MEDICAL GROUP | Facility: MEDICAL CENTER | Age: 62
End: 2017-07-03
Payer: COMMERCIAL

## 2017-07-03 VITALS
DIASTOLIC BLOOD PRESSURE: 78 MMHG | BODY MASS INDEX: 30.24 KG/M2 | RESPIRATION RATE: 16 BRPM | TEMPERATURE: 98.8 F | HEART RATE: 74 BPM | OXYGEN SATURATION: 95 % | HEIGHT: 59 IN | SYSTOLIC BLOOD PRESSURE: 136 MMHG | WEIGHT: 150 LBS

## 2017-07-03 DIAGNOSIS — I10 ESSENTIAL HYPERTENSION: ICD-10-CM

## 2017-07-03 DIAGNOSIS — E78.2 MIXED HYPERLIPIDEMIA: ICD-10-CM

## 2017-07-03 DIAGNOSIS — L98.9 SKIN LESION OF FACE: ICD-10-CM

## 2017-07-03 DIAGNOSIS — E66.9 OBESITY (BMI 30-39.9): ICD-10-CM

## 2017-07-03 PROCEDURE — 99214 OFFICE O/P EST MOD 30 MIN: CPT | Performed by: FAMILY MEDICINE

## 2017-07-03 RX ORDER — ATORVASTATIN CALCIUM 40 MG/1
40 TABLET, FILM COATED ORAL
Qty: 90 TAB | Refills: 3 | Status: SHIPPED | OUTPATIENT
Start: 2017-07-03 | End: 2018-01-23

## 2017-07-03 NOTE — PROGRESS NOTES
cc:  cholesterol    Subjective:     Milana Hernandez is a 61 y.o. female presenting for a follow up on her cholesterol.  Was started on lipitor 20mg daily about three months ago.  It makes her a little sleepy so she takes it in the evenings.  Otherwise, she has no side effects.  Has been trying to improve her diet and exercise more regularly.  Has gained a couple pounds.  She continues to work on this.  She saw her gynecologist, osphena was started for her urinary issues, pap was also done.  She is also worried about a mole on her face that seems to have been growing, is not exactly sure. Is otherwise asymptomatic     Review of systems:  See above.      Current outpatient prescriptions:   •  Ospemifene (OSPHENA PO), Take  by mouth., Disp: , Rfl:   •  atorvastatin (LIPITOR) 40 MG Tab, Take 1 Tab by mouth every bedtime. Indications: high cholesterol, Disp: 90 Tab, Rfl: 3  •  aspirin (ASA) 81 MG Chew Tab chewable tablet, Take 81 mg by mouth every day., Disp: , Rfl:   •  clobetasol (TEMOVATE) 0.05 % Cream, Apply thin layer twice a day to affected area.  Do not use more than 14 days at a time, Disp: 60 g, Rfl: 2  •  losartan (COZAAR) 100 MG Tab, Take 1 Tab by mouth every day., Disp: 90 Tab, Rfl: 3  •  hydrocodone-acetaminophen (NORCO) 5-325 MG Tab per tablet, Take 1 Tab by mouth every 8 hours as needed (severe pain)., Disp: 60 Tab, Rfl: 0    No Known Allergies    Past Medical History   Diagnosis Date   • Essential hypertension 1/6/2017     Past Surgical History   Procedure Laterality Date   • Tube & ectopic preg., removal     • Hip arthroplasty total Left 2/22/2017     Family History   Problem Relation Age of Onset   • Diabetes Mother    • Arrythmia Father      afib     Social History     Social History   • Marital Status:      Spouse Name: N/A   • Number of Children: N/A   • Years of Education: N/A     Occupational History   • Not on file.     Social History Main Topics   • Smoking status: Never Smoker    •  "Smokeless tobacco: Never Used   • Alcohol Use: 0.0 oz/week     0 Standard drinks or equivalent per week      Comment: rare   • Drug Use: No   • Sexual Activity: Not on file     Other Topics Concern   • Not on file     Social History Narrative       Objective:     Vitals: /78 mmHg  Pulse 74  Temp(Src) 37.1 °C (98.8 °F)  Resp 16  Ht 1.499 m (4' 11.02\")  Wt 68.04 kg (150 lb)  BMI 30.28 kg/m2  SpO2 95%  General: Alert, pleasant, NAD  HEENT: Normocephalic.   Psych:  Affect/mood is normal, judgement is good, memory is intact, grooming is appropriate.    Assessment/Plan:     Diagnoses and all orders for this visit:    Mixed hyperlipidemia  Reviewed recent labs with patient, some improvement in numbers. Will increase lipitor to 40mg daily and repeat labs in 6 months.  F/u in 6 months  -     atorvastatin (LIPITOR) 40 MG Tab; Take 1 Tab by mouth every bedtime. Indications: high cholesterol    Essential hypertension  Stable, continue with cozaar. Last labs were in February    Skin lesion of face  -     REFERRAL TO DERMATOLOGY    Obesity (BMI 30-39.9)  -     Patient identified as having weight management issue.  Appropriate orders and counseling given.      Return in about 6 months (around 1/3/2018) for Med check.  "

## 2017-07-03 NOTE — Clinical Note
[facility]  Abdirahman Lopez M.D.  [address]  [place] [address]  Fax: [phone]   Authorization for Release/Disclosure of   Protected Health Information   Name: MILANA JUAREZ : 1955 SSN: [id number]   Address: [address] [place] [address] Phone:    [phone] (home)    I authorize the entity listed below to release/disclose the PHI below to:   [facility]/Abdirahman Lopez M.D. and Abdirahman Lopez M.D.   Provider or Entity Name:  Dr Ronan Vela   Address   City, State, [facility]   Phone:      Fax:     Reason for request: continuity of care   Information to be released:    [  ] LAST COLONOSCOPY,  including any PATH REPORT and follow-up  [  ] LAST FIT/COLOGUARD RESULT [  ] LAST DEXA  [  ] LAST MAMMOGRAM  [ x ] LAST PAP  [  ] LAST LABS [  ] RETINA EXAM REPORT  [  ] IMMUNIZATION RECORDS  [  ] Release all info      [  ] Check here and initial the line next to each item to release ALL health information INCLUDING  _____ Care and treatment for drug and / or alcohol abuse  _____ HIV testing, infection status, or AIDS  _____ Genetic Testing    DATES OF SERVICE OR TIME PERIOD TO BE DISCLOSED: _____________  I understand and acknowledge that:  * This Authorization may be revoked at any time by you in writing, except if your health information has already been used or disclosed.  * Your health information that will be used or disclosed as a result of you signing this authorization could be re-disclosed by the recipient. If this occurs, your re-disclosed health information may no longer be protected by State or Federal laws.  * You may refuse to sign this Authorization. Your refusal will not affect your ability to obtain treatment.  * This Authorization becomes effective upon signing and will  on (date) __________.      If no date is indicated, this Authorization will  one (1) year from the signature date.    Name: Milana Juarez    Signature:   Date:          7/3/2017       PLEASE FAX REQUESTED RECORDS BACK TO: [phone]

## 2017-07-03 NOTE — MR AVS SNAPSHOT
"        Milana Hernandez   7/3/2017 7:20 AM   Office Visit   MRN: [id number]    Department:  [address] [place] [address]   Dept Phone:  [phone]    Description:  Female : 1955   Provider:  Abdirahman Lopez M.D.           Allergies as of 7/3/2017     No Known Allergies      You were diagnosed with     Mixed hyperlipidemia   [272.2.ICD-9-CM]       Essential hypertension   [2428106]       Skin lesion of face   [839342]         Vital Signs     Blood Pressure Pulse Temperature Respirations Height Weight    136/78 mmHg 74 37.1 °C (98.8 °F) 16 1.499 m (4' 11.02\") 68.04 kg (150 lb)    Body Mass Index Oxygen Saturation Smoking Status             30.28 kg/m2 95% Never Smoker          Basic Information     Date Of Birth Sex Race Ethnicity Preferred Language    1955 Female White Non- English      Your appointments     2018  7:20 AM   Established Patient with Abdirahman Lopez M.D.   [facility] [address] ([facility])    [address] [place] [address] [address]  [facility] [address]   [phone]           You will be receiving a confirmation call a few days before your appointment from our automated call confirmation system.              Problem List              ICD-10-CM Priority Class Noted - Resolved    Essential hypertension I10   2017 - Present    Cataract of both eyes H26.9   2017 - Present    Dyshidrotic eczema L30.1   2017 - Present    Mixed hyperlipidemia E78.2   3/24/2017 - Present      Health Maintenance        Date Due Completion Dates    PAP SMEAR 1974 ---    MAMMOGRAM 1993 ---    COLONOSCOPY 2003 ---    IMM ZOSTER VACCINE 2013 ---    IMM INFLUENZA (1) 2017 ---    IMM DTaP/Tdap/Td Vaccine (2 - Td) 2025            Current Immunizations     Tdap Vaccine 2015      Below and/or attached are the medications your provider expects you to take. Review all of your home medications and newly ordered medications with your provider and/or pharmacist. " Follow medication instructions as directed by your provider and/or pharmacist. Please keep your medication list with you and share with your provider. Update the information when medications are discontinued, doses are changed, or new medications (including over-the-counter products) are added; and carry medication information at all times in the event of emergency situations     Allergies:  No Known Allergies          Medications  Valid as of: July 03, 2017 -  7:44 AM    Generic Name Brand Name Tablet Size Instructions for use    Aspirin (Chew Tab) ASA 81 MG Take 81 mg by mouth every day.        Atorvastatin Calcium (Tab) LIPITOR 40 MG Take 1 Tab by mouth every bedtime. Indications: high cholesterol        Clobetasol Propionate (Cream) TEMOVATE 0.05 % Apply thin layer twice a day to affected area.  Do not use more than 14 days at a time        Hydrocodone-Acetaminophen (Tab) NORCO 5-325 MG Take 1 Tab by mouth every 8 hours as needed (severe pain).        Losartan Potassium (Tab) COZAAR 100 MG Take 1 Tab by mouth every day.        Ospemifene   Take  by mouth.        .                 Medicines prescribed today were sent to:     [facility] [address] [place] [address]    Phone: [phone] Fax: [phone]    Open 24 Hours?: No      Medication refill instructions:       If your prescription bottle indicates you have medication refills left, it is not necessary to call your provider’s office. Please contact your pharmacy and they will refill your medication.    If your prescription bottle indicates you do not have any refills left, you may request refills at any time through one of the following ways: The online [employer] system (except Urgent Care), by calling your provider’s office, or by asking your pharmacy to contact your provider’s office with a refill request. Medication refills are processed only during regular business hours and may not be  available until the next business day. Your provider may request additional information or to have a follow-up visit with you prior to refilling your medication.   *Please Note: Medication refills are assigned a new Rx number when refilled electronically. Your pharmacy may indicate that no refills were authorized even though a new prescription for the same medication is available at the pharmacy. Please request the medicine by name with the pharmacy before contacting your provider for a refill.        Referral     A referral request has been sent to our patient care coordination department. Please allow 3-5 business days for us to process this request and contact you either by phone or mail. If you do not hear from us by the 5th business day, please call us at [phone].           [employer] Access Code: Activation code not generated  Current [employer] Status: Active

## 2018-01-23 ENCOUNTER — OFFICE VISIT (OUTPATIENT)
Dept: MEDICAL GROUP | Facility: MEDICAL CENTER | Age: 63
End: 2018-01-23
Payer: COMMERCIAL

## 2018-01-23 VITALS
HEART RATE: 74 BPM | WEIGHT: 152 LBS | TEMPERATURE: 97.6 F | SYSTOLIC BLOOD PRESSURE: 164 MMHG | DIASTOLIC BLOOD PRESSURE: 100 MMHG | RESPIRATION RATE: 14 BRPM | OXYGEN SATURATION: 95 % | BODY MASS INDEX: 30.64 KG/M2 | HEIGHT: 59 IN

## 2018-01-23 DIAGNOSIS — Z23 NEED FOR VACCINATION: ICD-10-CM

## 2018-01-23 DIAGNOSIS — Z12.31 ENCOUNTER FOR SCREENING MAMMOGRAM FOR MALIGNANT NEOPLASM OF BREAST: ICD-10-CM

## 2018-01-23 DIAGNOSIS — E78.2 MIXED HYPERLIPIDEMIA: ICD-10-CM

## 2018-01-23 DIAGNOSIS — E66.9 OBESITY (BMI 30-39.9): ICD-10-CM

## 2018-01-23 DIAGNOSIS — I10 ESSENTIAL HYPERTENSION: ICD-10-CM

## 2018-01-23 DIAGNOSIS — M79.671 RIGHT FOOT PAIN: ICD-10-CM

## 2018-01-23 DIAGNOSIS — Z12.11 SCREEN FOR COLON CANCER: ICD-10-CM

## 2018-01-23 DIAGNOSIS — H26.9 CATARACT OF BOTH EYES, UNSPECIFIED CATARACT TYPE: ICD-10-CM

## 2018-01-23 PROCEDURE — 90686 IIV4 VACC NO PRSV 0.5 ML IM: CPT | Performed by: FAMILY MEDICINE

## 2018-01-23 PROCEDURE — 99214 OFFICE O/P EST MOD 30 MIN: CPT | Mod: 25 | Performed by: FAMILY MEDICINE

## 2018-01-23 PROCEDURE — 90471 IMMUNIZATION ADMIN: CPT | Performed by: FAMILY MEDICINE

## 2018-01-23 RX ORDER — ROSUVASTATIN CALCIUM 10 MG/1
10 TABLET, COATED ORAL EVERY EVENING
Qty: 90 TAB | Refills: 0 | Status: SHIPPED | OUTPATIENT
Start: 2018-01-23 | End: 2018-05-01 | Stop reason: SDUPTHER

## 2018-01-23 RX ORDER — LOSARTAN POTASSIUM AND HYDROCHLOROTHIAZIDE 25; 100 MG/1; MG/1
1 TABLET ORAL DAILY
Qty: 90 TAB | Refills: 1 | Status: SHIPPED | OUTPATIENT
Start: 2018-01-23 | End: 2018-07-31 | Stop reason: SDUPTHER

## 2018-01-23 ASSESSMENT — PATIENT HEALTH QUESTIONNAIRE - PHQ9: CLINICAL INTERPRETATION OF PHQ2 SCORE: 0

## 2018-01-23 NOTE — PROGRESS NOTES
cc: Blood pressure    Subjective:     Milana Hernandez is a 62 y.o. female presenting to discuss the followin. Hypertension: Has a history of arterial hypertension for many years. Is currently on losartan 100 mg once a day. She was on clonidine in the past, but was not very helpful. She has been checking her blood pressures at home, states that they are quite high. She is somewhat stressed lately. Her blood pressure today is also elevated. She has been taking her losartan 100 mg daily. Denies any side effects. Denies any chest pain, leg swelling, lightheadedness, dizziness. She states that she occasionally feels like she forgets to breathe at times, but otherwise no shortness of breath.    2. Cataracts: Had cataract surgery a year ago, needs another referral to her ophthalmologist as her insurance has changed.     3. High cholesterol: Has elevated triglycerides and low HDL. She was started on atorvastatin. She states that the 40 mg dose seems to be giving her muscle aches. She would like to switch to something.    4. Foot pain: She has been having intermittent right foot pain. She saw a podiatrist, had x-rays done, and completed a course of physical therapy. She continues to get intermittent foot swelling that usually resolves on its own. She is concerned for gout, would like a uric level checked    Review of systems:  See above.        Current Outpatient Prescriptions:   •  rosuvastatin (CRESTOR) 10 MG Tab, Take 1 Tab by mouth every evening., Disp: 90 Tab, Rfl: 0  •  losartan-hydrochlorothiazide (HYZAAR) 100-25 MG per tablet, Take 1 Tab by mouth every day., Disp: 90 Tab, Rfl: 1  •  Ospemifene (OSPHENA PO), Take  by mouth., Disp: , Rfl:   •  aspirin (ASA) 81 MG Chew Tab chewable tablet, Take 81 mg by mouth every day., Disp: , Rfl:   •  clobetasol (TEMOVATE) 0.05 % Cream, Apply thin layer twice a day to affected area.  Do not use more than 14 days at a time, Disp: 60 g, Rfl: 2    No Known Allergies    Past  "Medical History:   Diagnosis Date   • Essential hypertension 1/6/2017     Past Surgical History:   Procedure Laterality Date   • HIP ARTHROPLASTY TOTAL Left 2/22/2017   • TUBE & ECTOPIC PREG., REMOVAL       Family History   Problem Relation Age of Onset   • Diabetes Mother    • Arrythmia Father      afib     Social History     Social History   • Marital status:      Spouse name: N/A   • Number of children: N/A   • Years of education: N/A     Occupational History   • Not on file.     Social History Main Topics   • Smoking status: Never Smoker   • Smokeless tobacco: Never Used   • Alcohol use 0.0 oz/week      Comment: rare   • Drug use: No   • Sexual activity: Not on file     Other Topics Concern   • Not on file     Social History Narrative   • No narrative on file       Objective:     Vitals: BP (!) 164/100   Pulse 74   Temp 36.4 °C (97.6 °F)   Resp 14   Ht 1.499 m (4' 11.02\")   Wt 68.9 kg (152 lb)   SpO2 95%   BMI 30.68 kg/m²   General: Alert, pleasant, NAD  HEENT: Normocephalic.  PERRL, EOMI, no icterus or pallor.  Conjunctivae and lids normal. External ears normal.   Heart: Regular rate and rhythm.  S1 and S2 normal.  No murmurs appreciated.  Respiratory: Normal respiratory effort.  Clear to auscultation bilaterally.  Abdomen: Non-distended, soft  Skin: Warm, dry, no rashes.  Musculoskeletal: Gait is normal.  Moves all extremities well.  Extremities: No leg edema.  Psych:  Affect/mood is normal, judgement is good, memory is intact, grooming is appropriate.    Assessment/Plan:     Milana was seen today for ankle pain.    Diagnoses and all orders for this visit:    Essential hypertension  Will add hydrochlorothiazide to her regimen. will check the following labs and return in 4 weeks  -     COMP METABOLIC PANEL; Future  -     URIC ACID; Future  -     losartan-hydrochlorothiazide (HYZAAR) 100-25 MG per tablet; Take 1 Tab by mouth every day.    Mixed hyperlipidemia  Discuss reducing the dose of " atorvastatin or switching to a different statin. Patient would like to switch, rosuvastatin prescribed.  -     COMP METABOLIC PANEL; Future  -     LIPID PROFILE; Future  -     rosuvastatin (CRESTOR) 10 MG Tab; Take 1 Tab by mouth every evening.    Screen for colon cancer  -     OCCULT BLOOD FECES IMMUNOASSAY (FIT); Future    Cataract of both eyes, unspecified cataract type  -     REFERRAL TO OPHTHALMOLOGY    Encounter for screening mammogram for malignant neoplasm of breast  -     MA-MAMMO SCREENING BILAT W/FELICIA W/CAD; Future    Right foot pain  -     URIC ACID; Future    Need for vaccination  -     INFLUENZA VACCINE QUAD INJ >3Y(PF)    Obesity (BMI 30-39.9)  -     Patient identified as having weight management issue.  Appropriate orders and counseling given.        Return in about 4 weeks (around 2/20/2018) for High blood pressure.

## 2018-02-09 ENCOUNTER — HOSPITAL ENCOUNTER (OUTPATIENT)
Dept: RADIOLOGY | Facility: MEDICAL CENTER | Age: 63
End: 2018-02-09
Attending: FAMILY MEDICINE
Payer: COMMERCIAL

## 2018-02-09 DIAGNOSIS — Z12.31 ENCOUNTER FOR SCREENING MAMMOGRAM FOR MALIGNANT NEOPLASM OF BREAST: ICD-10-CM

## 2018-02-09 PROCEDURE — 77067 SCR MAMMO BI INCL CAD: CPT

## 2018-02-20 ENCOUNTER — HOSPITAL ENCOUNTER (OUTPATIENT)
Dept: LAB | Facility: MEDICAL CENTER | Age: 63
End: 2018-02-20
Attending: FAMILY MEDICINE
Payer: COMMERCIAL

## 2018-02-20 DIAGNOSIS — M79.671 RIGHT FOOT PAIN: ICD-10-CM

## 2018-02-20 DIAGNOSIS — E78.2 MIXED HYPERLIPIDEMIA: ICD-10-CM

## 2018-02-20 DIAGNOSIS — I10 ESSENTIAL HYPERTENSION: ICD-10-CM

## 2018-02-20 LAB
ALBUMIN SERPL BCP-MCNC: 4.3 G/DL (ref 3.2–4.9)
ALBUMIN/GLOB SERPL: 1.3 G/DL
ALP SERPL-CCNC: 46 U/L (ref 30–99)
ALT SERPL-CCNC: 25 U/L (ref 2–50)
ANION GAP SERPL CALC-SCNC: 10 MMOL/L (ref 0–11.9)
AST SERPL-CCNC: 26 U/L (ref 12–45)
BILIRUB SERPL-MCNC: 0.5 MG/DL (ref 0.1–1.5)
BUN SERPL-MCNC: 26 MG/DL (ref 8–22)
CALCIUM SERPL-MCNC: 9.7 MG/DL (ref 8.5–10.5)
CHLORIDE SERPL-SCNC: 102 MMOL/L (ref 96–112)
CHOLEST SERPL-MCNC: 169 MG/DL (ref 100–199)
CO2 SERPL-SCNC: 26 MMOL/L (ref 20–33)
CREAT SERPL-MCNC: 1.07 MG/DL (ref 0.5–1.4)
GLOBULIN SER CALC-MCNC: 3.4 G/DL (ref 1.9–3.5)
GLUCOSE SERPL-MCNC: 153 MG/DL (ref 65–99)
HDLC SERPL-MCNC: 40 MG/DL
LDLC SERPL CALC-MCNC: 78 MG/DL
POTASSIUM SERPL-SCNC: 3.9 MMOL/L (ref 3.6–5.5)
PROT SERPL-MCNC: 7.7 G/DL (ref 6–8.2)
SODIUM SERPL-SCNC: 138 MMOL/L (ref 135–145)
TRIGL SERPL-MCNC: 257 MG/DL (ref 0–149)
URATE SERPL-MCNC: 9.1 MG/DL (ref 1.9–8.2)

## 2018-02-20 PROCEDURE — 84550 ASSAY OF BLOOD/URIC ACID: CPT

## 2018-02-20 PROCEDURE — 80061 LIPID PANEL: CPT

## 2018-02-20 PROCEDURE — 36415 COLL VENOUS BLD VENIPUNCTURE: CPT

## 2018-02-20 PROCEDURE — 80053 COMPREHEN METABOLIC PANEL: CPT

## 2018-02-26 ENCOUNTER — OFFICE VISIT (OUTPATIENT)
Dept: MEDICAL GROUP | Facility: MEDICAL CENTER | Age: 63
End: 2018-02-26
Payer: COMMERCIAL

## 2018-02-26 VITALS
DIASTOLIC BLOOD PRESSURE: 76 MMHG | TEMPERATURE: 98.4 F | HEART RATE: 74 BPM | BODY MASS INDEX: 30.04 KG/M2 | RESPIRATION RATE: 14 BRPM | WEIGHT: 149 LBS | OXYGEN SATURATION: 96 % | SYSTOLIC BLOOD PRESSURE: 128 MMHG | HEIGHT: 59 IN

## 2018-02-26 DIAGNOSIS — E66.9 OBESITY (BMI 30-39.9): ICD-10-CM

## 2018-02-26 DIAGNOSIS — E78.2 MIXED HYPERLIPIDEMIA: ICD-10-CM

## 2018-02-26 DIAGNOSIS — I10 ESSENTIAL HYPERTENSION: ICD-10-CM

## 2018-02-26 PROCEDURE — 99214 OFFICE O/P EST MOD 30 MIN: CPT | Performed by: FAMILY MEDICINE

## 2018-02-26 NOTE — PROGRESS NOTES
cc: Blood pressure    Subjective:     Milana Hernandez is a 62 y.o. female presenting for follow-up of her blood pressure. Hydrochlorothiazide was added to her regimen at her last visit. She has been taking losartan-hydrochlorothiazide 100-25 milligrams daily with no issues. Denies any side effects. She also changed her cholesterol medicine to rosuvastatin 10 mg daily. Denies any chest pain, shortness breath, lightheadedness, dizziness, leg swelling, myalgias. She has also been improving her diet, eliminating carbs. Has lost about 3 pounds over the past month. She is quite happy about this.    Review of systems:  See above.        Current Outpatient Prescriptions:   •  rosuvastatin (CRESTOR) 10 MG Tab, Take 1 Tab by mouth every evening., Disp: 90 Tab, Rfl: 0  •  losartan-hydrochlorothiazide (HYZAAR) 100-25 MG per tablet, Take 1 Tab by mouth every day., Disp: 90 Tab, Rfl: 1  •  aspirin (ASA) 81 MG Chew Tab chewable tablet, Take 81 mg by mouth every day., Disp: , Rfl:   •  clobetasol (TEMOVATE) 0.05 % Cream, Apply thin layer twice a day to affected area.  Do not use more than 14 days at a time, Disp: 60 g, Rfl: 2    No Known Allergies    Past Medical History:   Diagnosis Date   • Essential hypertension 1/6/2017     Past Surgical History:   Procedure Laterality Date   • HIP ARTHROPLASTY TOTAL Left 2/22/2017   • TUBE & ECTOPIC PREG., REMOVAL       Family History   Problem Relation Age of Onset   • Diabetes Mother    • Arrythmia Father      afib     Social History     Social History   • Marital status:      Spouse name: N/A   • Number of children: N/A   • Years of education: N/A     Occupational History   • Not on file.     Social History Main Topics   • Smoking status: Never Smoker   • Smokeless tobacco: Never Used   • Alcohol use 0.0 oz/week      Comment: rare   • Drug use: No   • Sexual activity: Not on file     Other Topics Concern   • Not on file     Social History Narrative   • No narrative on file  "      Objective:     Vitals: /76   Pulse 74   Temp 36.9 °C (98.4 °F)   Resp 14   Ht 1.499 m (4' 11.02\")   Wt 67.6 kg (149 lb)   SpO2 96%   BMI 30.08 kg/m²   General: Alert, pleasant, NAD  HEENT: Normocephalic  Psych:  Affect/mood is normal, judgement is good, memory is intact, grooming is appropriate.    Assessment/Plan:     Diagnoses and all orders for this visit:    Essential hypertension  Improved, stable. Continue current medications. Follow up in 3 months. Labs reviewed with patient, are up-to-date.  We also discussed the uric acid level. She would like to hold off on meds for now    Obesity (BMI 30-39.9)  Improving.  Discussed healthy diet and exercise    Mixed hyperlipidemia  Stable, continue rosuvastatin. Her triglycerides are still elevated but improved, but she would like to continue with her diet rather than increasing medication at this point.  Discussed starting fish oil as well.      Return in about 3 months (around 5/26/2018) for routine follow up.  "

## 2018-06-01 ENCOUNTER — OFFICE VISIT (OUTPATIENT)
Dept: MEDICAL GROUP | Facility: MEDICAL CENTER | Age: 63
End: 2018-06-01
Payer: COMMERCIAL

## 2018-06-01 VITALS
WEIGHT: 149 LBS | RESPIRATION RATE: 14 BRPM | OXYGEN SATURATION: 96 % | BODY MASS INDEX: 30.04 KG/M2 | SYSTOLIC BLOOD PRESSURE: 124 MMHG | HEART RATE: 74 BPM | TEMPERATURE: 98.6 F | DIASTOLIC BLOOD PRESSURE: 78 MMHG | HEIGHT: 59 IN

## 2018-06-01 DIAGNOSIS — Z87.39 H/O: GOUT: ICD-10-CM

## 2018-06-01 DIAGNOSIS — E79.0 ELEVATED URIC ACID IN BLOOD: ICD-10-CM

## 2018-06-01 DIAGNOSIS — E78.2 MIXED HYPERLIPIDEMIA: ICD-10-CM

## 2018-06-01 DIAGNOSIS — I10 ESSENTIAL HYPERTENSION: ICD-10-CM

## 2018-06-01 DIAGNOSIS — J01.00 ACUTE NON-RECURRENT MAXILLARY SINUSITIS: ICD-10-CM

## 2018-06-01 PROCEDURE — 99214 OFFICE O/P EST MOD 30 MIN: CPT | Performed by: FAMILY MEDICINE

## 2018-06-01 RX ORDER — ALLOPURINOL 300 MG/1
300 TABLET ORAL DAILY
Qty: 90 TAB | Refills: 0 | Status: SHIPPED | OUTPATIENT
Start: 2018-06-01 | End: 2018-07-31 | Stop reason: SDUPTHER

## 2018-06-01 RX ORDER — FLUTICASONE PROPIONATE 50 MCG
1 SPRAY, SUSPENSION (ML) NASAL DAILY
Qty: 16 G | Refills: 1 | Status: SHIPPED | OUTPATIENT
Start: 2018-06-01 | End: 2022-07-13

## 2018-06-01 RX ORDER — ALLOPURINOL 100 MG/1
100 TABLET ORAL 2 TIMES DAILY
COMMUNITY
End: 2018-06-01 | Stop reason: SDUPTHER

## 2018-06-01 RX ORDER — AMOXICILLIN AND CLAVULANATE POTASSIUM 875; 125 MG/1; MG/1
1 TABLET, FILM COATED ORAL 2 TIMES DAILY
Qty: 14 TAB | Refills: 0 | Status: SHIPPED | OUTPATIENT
Start: 2018-06-01 | End: 2018-06-08

## 2018-06-01 NOTE — PROGRESS NOTES
cc: Cough    Subjective:     Milana Hernandez is a 62 y.o. female presenting for:    1. Hypertension: Has a history of arterial hypertension for many years. Is currently on losartan-hctz 100-25 mg once a day. Blood pressures have been stable. Denies any chest pain, shortness of breath, leg swelling, jaw pain, lightheadedness, dizziness.    2. High cholesterol: Has elevated triglycerides and low HDL. Was started on crestor 10mg after lipid panel 2/2018. Denies any side effects, no myalgias.    3. Gout: Has a history of recurrent gout episodes, usually in her right knee. Her last uric acid level was elevated at 9.1. A friend of hers prescribed allopurinol 100 mg twice a day, she has been taking in less for the past several weeks. Denies any side effects.    4. Cough: For the past 2 weeks, has been having a cough and nasal congestion, symptoms seem to be worsening. Denies any fevers, fatigue, nausea, vomiting, ear pain, sore throat, chest pain, shortness of breath, abdominal pain, diarrhea, rashes, recent travel, sick contacts. Has been using Mucinex and DayQuil with some relief.     Review of systems:  See above.       Current Outpatient Prescriptions:   •  allopurinol (ZYLOPRIM) 300 MG Tab, Take 1 Tab by mouth every day., Disp: 90 Tab, Rfl: 0  •  amoxicillin-clavulanate (AUGMENTIN) 875-125 MG Tab, Take 1 Tab by mouth 2 times a day for 7 days., Disp: 14 Tab, Rfl: 0  •  fluticasone (FLONASE) 50 MCG/ACT nasal spray, Spray 1 Spray in nose every day., Disp: 16 g, Rfl: 1  •  rosuvastatin (CRESTOR) 10 MG Tab, Take 1 Tab by mouth every day., Disp: 90 Tab, Rfl: 3  •  losartan-hydrochlorothiazide (HYZAAR) 100-25 MG per tablet, Take 1 Tab by mouth every day., Disp: 90 Tab, Rfl: 1  •  aspirin (ASA) 81 MG Chew Tab chewable tablet, Take 81 mg by mouth every day., Disp: , Rfl:     No Known Allergies    Past Medical History:   Diagnosis Date   • Essential hypertension 1/6/2017     Past Surgical History:   Procedure Laterality Date  "  • HIP ARTHROPLASTY TOTAL Left 2/22/2017   • TUBE & ECTOPIC PREG., REMOVAL       Family History   Problem Relation Age of Onset   • Diabetes Mother    • Arrythmia Father      afib     Social History     Social History   • Marital status:      Spouse name: N/A   • Number of children: N/A   • Years of education: N/A     Occupational History   • Not on file.     Social History Main Topics   • Smoking status: Never Smoker   • Smokeless tobacco: Never Used   • Alcohol use 0.0 oz/week      Comment: rare   • Drug use: No   • Sexual activity: Not on file     Other Topics Concern   • Not on file     Social History Narrative   • No narrative on file       Objective:     Vitals: /78   Pulse 74   Temp 37 °C (98.6 °F)   Resp 14   Ht 1.499 m (4' 11.02\")   Wt 67.6 kg (149 lb)   SpO2 96%   BMI 30.08 kg/m²   General: Alert, pleasant, NAD  HEENT: Normocephalic.  PERRL, EOMI, no icterus or pallor.  Conjunctivae and lids normal. External ears normal. Tympanic membranes pearly, opaque.  Nares patent, mucosa pink, drainage present.  Maxillary sinus tenderness.  Oropharynx non-erythematous, mucous membranes moist.  Neck supple.  No thyromegaly or masses palpated. No cervical or supraclavicular lymphadenopathy.  Heart: Regular rate and rhythm.  S1 and S2 normal.  No murmurs appreciated.  Respiratory: Normal respiratory effort.  Clear to auscultation bilaterally.  Abdomen: Non-distended, soft  Skin: Warm, dry, no rashes.  Musculoskeletal: Gait is normal.  Moves all extremities well.  Extremities: No leg edema.    Psych:  Affect/mood is normal, judgement is good, memory is intact, grooming is appropriate.    Assessment/Plan:     Milana was seen today for cough and runny nose.    Diagnoses and all orders for this visit:    Acute non-recurrent maxillary sinusitis  -     amoxicillin-clavulanate (AUGMENTIN) 875-125 MG Tab; Take 1 Tab by mouth 2 times a day for 7 days.  -     fluticasone (FLONASE) 50 MCG/ACT nasal spray; " Spray 1 Spray in nose every day.    H/O: gout  Elevated uric acid in blood  Will increase allopurinol to 300 mg daily and recheck a uric acid level  -     URIC ACID; Future  -     allopurinol (ZYLOPRIM) 300 MG Tab; Take 1 Tab by mouth every day.    Essential hypertension  Stable, continue current medication    Mixed hyperlipidemia  Stable, continue Crestor, we will repeat lipids in 2 months  -     LIPID PROFILE; Future      Return in about 4 months (around 10/1/2018) for Med check.

## 2018-07-31 DIAGNOSIS — I10 ESSENTIAL HYPERTENSION: ICD-10-CM

## 2018-07-31 RX ORDER — LOSARTAN POTASSIUM AND HYDROCHLOROTHIAZIDE 25; 100 MG/1; MG/1
1 TABLET ORAL DAILY
Qty: 90 TAB | Refills: 1 | Status: SHIPPED | OUTPATIENT
Start: 2018-07-31

## 2018-07-31 RX ORDER — ALLOPURINOL 300 MG/1
300 TABLET ORAL DAILY
Qty: 90 TAB | Refills: 1 | Status: SHIPPED | OUTPATIENT
Start: 2018-07-31

## 2018-08-03 ENCOUNTER — HOSPITAL ENCOUNTER (OUTPATIENT)
Dept: LAB | Facility: MEDICAL CENTER | Age: 63
End: 2018-08-03
Attending: FAMILY MEDICINE
Payer: COMMERCIAL

## 2018-08-03 DIAGNOSIS — Z87.39 H/O: GOUT: ICD-10-CM

## 2018-08-03 DIAGNOSIS — E79.0 ELEVATED URIC ACID IN BLOOD: ICD-10-CM

## 2018-08-03 LAB — URATE SERPL-MCNC: 5.1 MG/DL (ref 1.9–8.2)

## 2018-08-03 PROCEDURE — 84550 ASSAY OF BLOOD/URIC ACID: CPT

## 2018-08-03 PROCEDURE — 36415 COLL VENOUS BLD VENIPUNCTURE: CPT

## 2021-03-03 DIAGNOSIS — Z23 NEED FOR VACCINATION: ICD-10-CM

## 2022-07-13 PROBLEM — M16.11 OSTEOARTHRITIS OF RIGHT HIP: Status: ACTIVE | Noted: 2022-07-13
